# Patient Record
Sex: MALE | Race: WHITE | Employment: FULL TIME
[De-identification: names, ages, dates, MRNs, and addresses within clinical notes are randomized per-mention and may not be internally consistent; named-entity substitution may affect disease eponyms.]

---

## 2024-07-14 ENCOUNTER — HOSPITAL ENCOUNTER (EMERGENCY)
Facility: HOSPITAL | Age: 34
Discharge: HOME | End: 2024-07-14
Payer: COMMERCIAL

## 2024-07-14 VITALS
HEART RATE: 94 BPM | WEIGHT: 315 LBS | RESPIRATION RATE: 22 BRPM | SYSTOLIC BLOOD PRESSURE: 153 MMHG | DIASTOLIC BLOOD PRESSURE: 76 MMHG | HEIGHT: 73 IN | OXYGEN SATURATION: 99 % | BODY MASS INDEX: 41.75 KG/M2 | TEMPERATURE: 98.3 F

## 2024-07-14 DIAGNOSIS — L03.119 CELLULITIS AND ABSCESS OF LEG: Primary | ICD-10-CM

## 2024-07-14 DIAGNOSIS — R73.9 ELEVATED BLOOD SUGAR: ICD-10-CM

## 2024-07-14 DIAGNOSIS — L02.419 CELLULITIS AND ABSCESS OF LEG: Primary | ICD-10-CM

## 2024-07-14 LAB — GLUCOSE BLD STRIP.AUTO-MCNC: 234 MG/DL (ref 70–110)

## 2024-07-14 PROCEDURE — 2500000003 HC RX 250 WO HCPCS: Performed by: PHYSICIAN ASSISTANT

## 2024-07-14 PROCEDURE — 99283 EMERGENCY DEPT VISIT LOW MDM: CPT

## 2024-07-14 PROCEDURE — 10060 I&D ABSCESS SIMPLE/SINGLE: CPT

## 2024-07-14 PROCEDURE — 6370000000 HC RX 637 (ALT 250 FOR IP): Performed by: PHYSICIAN ASSISTANT

## 2024-07-14 PROCEDURE — 82962 GLUCOSE BLOOD TEST: CPT

## 2024-07-14 PROCEDURE — 10061 I&D ABSCESS COMP/MULTIPLE: CPT

## 2024-07-14 RX ORDER — LIDOCAINE HYDROCHLORIDE AND EPINEPHRINE 10; 10 MG/ML; UG/ML
20 INJECTION, SOLUTION INFILTRATION; PERINEURAL
Status: COMPLETED | OUTPATIENT
Start: 2024-07-14 | End: 2024-07-14

## 2024-07-14 RX ORDER — DOXYCYCLINE 100 MG/1
100 CAPSULE ORAL
Status: COMPLETED | OUTPATIENT
Start: 2024-07-14 | End: 2024-07-14

## 2024-07-14 RX ORDER — DOXYCYCLINE HYCLATE 100 MG
100 TABLET ORAL 2 TIMES DAILY
Qty: 20 TABLET | Refills: 0 | Status: SHIPPED | OUTPATIENT
Start: 2024-07-14 | End: 2024-07-14

## 2024-07-14 RX ORDER — DOXYCYCLINE HYCLATE 100 MG
100 TABLET ORAL 2 TIMES DAILY
Qty: 20 TABLET | Refills: 0 | Status: SHIPPED | OUTPATIENT
Start: 2024-07-14 | End: 2024-07-24

## 2024-07-14 RX ADMIN — LIDOCAINE HYDROCHLORIDE,EPINEPHRINE BITARTRATE 20 ML: 10; .01 INJECTION, SOLUTION INFILTRATION; PERINEURAL at 14:00

## 2024-07-14 RX ADMIN — DOXYCYCLINE 100 MG: 100 CAPSULE ORAL at 14:31

## 2024-07-14 ASSESSMENT — PAIN DESCRIPTION - LOCATION
LOCATION: LEG
LOCATION: OTHER (COMMENT)

## 2024-07-14 ASSESSMENT — PAIN SCALES - GENERAL: PAINLEVEL_OUTOF10: 5

## 2024-07-14 ASSESSMENT — PAIN DESCRIPTION - DESCRIPTORS: DESCRIPTORS: STABBING;BURNING

## 2024-07-14 ASSESSMENT — PAIN DESCRIPTION - FREQUENCY: FREQUENCY: CONTINUOUS

## 2024-07-14 ASSESSMENT — PAIN - FUNCTIONAL ASSESSMENT: PAIN_FUNCTIONAL_ASSESSMENT: 0-10

## 2024-07-14 ASSESSMENT — PAIN DESCRIPTION - ORIENTATION: ORIENTATION: LEFT

## 2024-07-14 ASSESSMENT — LIFESTYLE VARIABLES
HOW OFTEN DO YOU HAVE A DRINK CONTAINING ALCOHOL: NEVER
HOW MANY STANDARD DRINKS CONTAINING ALCOHOL DO YOU HAVE ON A TYPICAL DAY: PATIENT DOES NOT DRINK

## 2024-07-14 ASSESSMENT — PAIN DESCRIPTION - PAIN TYPE: TYPE: ACUTE PAIN

## 2024-07-14 NOTE — ED PROVIDER NOTES
EMERGENCY DEPARTMENT HISTORY & PHYSICAL EXAM    7/14/24, 12:40 PM EDT    Clinical Impression:  1. Cellulitis and abscess of leg    2. Elevated blood sugar        Assessment/Differential Diagnosis:     Ddx sepsis, cellulitis, abscess, trauma all considered    ED Course:   Initial assessment performed. The patients presenting problems have been discussed, and they are in agreement with the care plan formulated and outlined with them.  I have encouraged them to ask questions as they arise throughout their visit.    Patient comes the ED with his wife.  Patient states he started with what appeared to be a small pimple on his inner left thigh earlier this week.  He states it slowly gotten worse and over the past 2 days has been excruciating and increasing in size.  There is been no drainage.  No other areas of rash.  Patient denies fever, chills or flulike illness.  No shortness of breath, chest pain or abdominal pain.  Other than the pain at his left inner thigh no other complaints.  Patient states he takes no chronic medications has no chronic medical problems although has not had a physical in several years.    Exam with morbidly obese adult male sitting on stretcher.  Initial vitals reveal his blood pressure elevated, heart rate initially 125, but decreased with relaxation into the mid 90s.  He is afebrile.  Heart regular rate rhythm, lungs clear to auscultation, abdomen is soft and nontender.  Examination of his left inner thigh reveals a large area of tenderness, redness, warmth and induration with some central fluctuance.  Induration measures about 10 x 7 cm with central fluctuance.  No drainage.  Surrounding skin appears normal.  No calf tenderness.  Foot is neurovascular intact.    Patient states he gets very anxious when going to the doctor, likely cause of his initial tachycardia as it has been normal once he relaxed.  Patient has localized area of cellulitis with abscess

## 2024-07-14 NOTE — DISCHARGE INSTRUCTIONS
Your blood sugar is elevated today.  This could indicate diabetes.  It is very important a follow-up with primary care in the next few days for testing and treatment.  Uncontrolled diabetes can lead to skin infection, abscess, blindness, kidney disease, stroke, heart attack, death.    You have a large area of cellulitis and abscess on your left thigh.  Incision and drainage was done with large amount of infectious material removed.  Packing was placed.  This will need to be removed in 2 days, you can return here or to your doctor  The area should continue to drain and to dry gauze.  Change her gauze often  Warm salt water soaks, 15 minutes, 4 times a day will help this to continue to drain  Motrin, 200 mg, 3 every 8 hours with food  Tylenol, 325 mg, 2 every 6-8 hours as needed  Take antibiotic as prescribed, take with food.  Take probiotic daily  Return to ER if you develop any new or worsening symptoms, fever, chills, flulike illness, doubling of your area of rash or any new concerns

## 2024-07-14 NOTE — ED TRIAGE NOTES
Pt presents to ED due to area of concern on upper thigh. Not visualized in triage. Pt tachy in triage and states took tylenol at 1100.